# Patient Record
Sex: MALE | URBAN - METROPOLITAN AREA
[De-identification: names, ages, dates, MRNs, and addresses within clinical notes are randomized per-mention and may not be internally consistent; named-entity substitution may affect disease eponyms.]

---

## 2023-06-01 ENCOUNTER — APPOINTMENT (OUTPATIENT)
Dept: URBAN - METROPOLITAN AREA CLINIC 313 | Age: 58
Setting detail: DERMATOLOGY
End: 2023-06-01

## 2023-06-01 DIAGNOSIS — L27.0 GENERALIZED SKIN ERUPTION DUE TO DRUGS AND MEDICAMENTS TAKEN INTERNALLY: ICD-10-CM

## 2023-06-01 DIAGNOSIS — L91.8 OTHER HYPERTROPHIC DISORDERS OF THE SKIN: ICD-10-CM

## 2023-06-01 DIAGNOSIS — L90.5 SCAR CONDITIONS AND FIBROSIS OF SKIN: ICD-10-CM

## 2023-06-01 PROCEDURE — OTHER COUNSELING: OTHER

## 2023-06-01 PROCEDURE — OTHER PRESCRIPTION: OTHER

## 2023-06-01 PROCEDURE — 99213 OFFICE O/P EST LOW 20 MIN: CPT

## 2023-06-01 PROCEDURE — OTHER ADDITIONAL NOTES: OTHER

## 2023-06-01 RX ORDER — TRIAMCINOLONE ACETONIDE 1 MG/G
CREAM TOPICAL
Qty: 454 | Refills: 1 | Status: CANCELLED | COMMUNITY
Start: 2023-06-01

## 2023-06-01 RX ORDER — TRIAMCINOLONE ACETONIDE 1 MG/G
CREAM TOPICAL
Qty: 454 | Refills: 1 | Status: ERX

## 2023-06-01 ASSESSMENT — LOCATION DETAILED DESCRIPTION DERM
LOCATION DETAILED: RIGHT AXILLARY VAULT
LOCATION DETAILED: PERIUMBILICAL SKIN
LOCATION DETAILED: LEFT AXILLARY VAULT

## 2023-06-01 ASSESSMENT — LOCATION SIMPLE DESCRIPTION DERM
LOCATION SIMPLE: LEFT AXILLARY VAULT
LOCATION SIMPLE: RIGHT AXILLARY VAULT
LOCATION SIMPLE: ABDOMEN

## 2023-06-01 ASSESSMENT — LOCATION ZONE DERM
LOCATION ZONE: TRUNK
LOCATION ZONE: AXILLAE

## 2023-06-15 ENCOUNTER — APPOINTMENT (OUTPATIENT)
Dept: URBAN - METROPOLITAN AREA CLINIC 313 | Age: 58
Setting detail: DERMATOLOGY
End: 2023-06-15

## 2023-06-15 DIAGNOSIS — L20.89 OTHER ATOPIC DERMATITIS: ICD-10-CM

## 2023-06-15 DIAGNOSIS — L27.0 GENERALIZED SKIN ERUPTION DUE TO DRUGS AND MEDICAMENTS TAKEN INTERNALLY: ICD-10-CM

## 2023-06-15 PROBLEM — L20.84 INTRINSIC (ALLERGIC) ECZEMA: Status: ACTIVE | Noted: 2023-06-15

## 2023-06-15 PROCEDURE — OTHER PRESCRIPTION MEDICATION MANAGEMENT: OTHER

## 2023-06-15 PROCEDURE — OTHER COUNSELING: OTHER

## 2023-06-15 PROCEDURE — OTHER PRESCRIPTION: OTHER

## 2023-06-15 PROCEDURE — 99213 OFFICE O/P EST LOW 20 MIN: CPT

## 2023-06-15 RX ORDER — TRIAMCINOLONE ACETONIDE 1 MG/G
CREAM TOPICAL
Qty: 80 | Refills: 4 | Status: ERX

## 2023-06-15 ASSESSMENT — LOCATION SIMPLE DESCRIPTION DERM
LOCATION SIMPLE: RIGHT ELBOW
LOCATION SIMPLE: LEFT ELBOW

## 2023-06-15 ASSESSMENT — LOCATION DETAILED DESCRIPTION DERM
LOCATION DETAILED: RIGHT ELBOW
LOCATION DETAILED: LEFT ELBOW

## 2023-06-15 ASSESSMENT — LOCATION ZONE DERM: LOCATION ZONE: ARM

## 2023-06-15 NOTE — PROCEDURE: PRESCRIPTION MEDICATION MANAGEMENT
Render In Strict Bullet Format?: Yes
Initiate Treatment: Triamcinolone 0.1% ointment bid for 2 weeks to elbows
Detail Level: Zone

## 2023-06-15 NOTE — PROCEDURE: COUNSELING
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
Detail Level: Simple
Detail Level: Generalized

## 2023-07-17 ENCOUNTER — APPOINTMENT (OUTPATIENT)
Dept: URBAN - METROPOLITAN AREA CLINIC 313 | Age: 58
Setting detail: DERMATOLOGY
End: 2023-07-17

## 2023-07-17 DIAGNOSIS — L27.0 GENERALIZED SKIN ERUPTION DUE TO DRUGS AND MEDICAMENTS TAKEN INTERNALLY: ICD-10-CM

## 2023-07-17 DIAGNOSIS — L20.89 OTHER ATOPIC DERMATITIS: ICD-10-CM

## 2023-07-17 PROCEDURE — OTHER PRESCRIPTION MEDICATION MANAGEMENT: OTHER

## 2023-07-17 PROCEDURE — 99213 OFFICE O/P EST LOW 20 MIN: CPT

## 2023-07-17 PROCEDURE — OTHER COUNSELING: OTHER

## 2023-07-17 ASSESSMENT — LOCATION SIMPLE DESCRIPTION DERM
LOCATION SIMPLE: RIGHT ELBOW
LOCATION SIMPLE: LEFT ELBOW

## 2023-07-17 ASSESSMENT — LOCATION DETAILED DESCRIPTION DERM
LOCATION DETAILED: LEFT ELBOW
LOCATION DETAILED: RIGHT ELBOW

## 2023-07-17 ASSESSMENT — LOCATION ZONE DERM: LOCATION ZONE: ARM

## 2023-07-17 NOTE — PROCEDURE: PRESCRIPTION MEDICATION MANAGEMENT
Continue Regimen: triamcinolone acetonide 0.1 % topical cream \\nQuantity: 80.0 g  Days Supply: 30\\nSig: Apply twice daily to rash  on body up to 2 weeks hold for 1 week PRN with flares
Detail Level: Zone
Render In Strict Bullet Format?: Yes

## 2023-07-17 NOTE — PROCEDURE: COUNSELING
Detail Level: Simple
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
Detail Level: Generalized

## 2024-02-16 NOTE — PROCEDURE: ADDITIONAL NOTES
Elena SYLVIE Salgado  [address] [facility] [address]    Your procedure is on 3/4/24 Anticipated Arrival Time 10:00 AM  [place] [address] [place]  Procedure Name: Interlaminar Epidural Steroid Injection, Lumbar, L3/L4, Right Parasaggital     We understand that you are looking forward to pain relief, yet your safety and comfort is important to us during your procedure.  Your care team will assess you the day of your procedure.     If any of the below occur on the day of the procedure, you may be asked to reschedule:   low or high blood pressure   low or high blood sugar  irregular heartbeat   recent or current infection   not holding certain medications as instructed  not fasting (eating or drinking) for procedures that you are getting sedation for  any other medical reason which your care team feels it is best to delay your procedure    On the day of your procedure we are limited in our ability to manage your chronic pain medications. We are only able to provide a one time refill of existing medications that are not controlled substances and are at stable dosing. If you have questions regarding other medication options, or your medication is not working, we will facilitate a follow up appointment in office. Thank you for your understanding.    The time listed above is an estimated time. Our pre-procedural department will call you 1-2 days prior to your procedure to confirm your arrival time.  Please note- if your procedure is scheduled near the end of the day, you may be asked to move your procedure to an earlier time due to changes in the schedule and/or possibly reschedule to a different date.    If you are receiving sedation (something to help you relax) or having an epidural steroid injection, you must have a responsible adult  to take you home after your procedure. If you do not have a responsible adult , your procedure may be cancelled. If you choose to 
take the bus, cab, Uber, etc. and do not have a responsible adult to accompany you, your procedure may be cancelled.    Is a  needed?: Yes    Please notify the office immediately if:  You develop an infection of any kind  You have a fever within 7 days of your procedure  You start any new medications since you were seen in the office by the pain management provider, including antibiotics,   Received any dose of the COVID vaccine in the past two weeks or planning on getting a dosage within the next 2 weeks   No longer have any pain and wish to cancel the procedure    According to [facility] cancellation policy, if you need to change a scheduled appointment, you must call the Pain Management Department  at least 72 hours before your appointment to cancel or reschedule.     Elena is scheduled with local anesthetic. Patient is educated that it is not necessary to withhold from eating or drinking the day of their procedure.    One or more of the medications from your home medication list have antiplatelet or anticoagulant (blood thinning) properties.  Due to the risk for bleeding associated with your procedure the following medications must be stopped for the number of days indicated before your procedure:    Medication Name and Days to Hold:  Excedrin (6 day hold)  Ibuprofen (Advil, Motrin) (1 day hold)    Do you have any history of bleeding or clotting disorders? No    If you have a medical implant, please bring your remote control to turn off the device.     Please contact your insurance to verify benefits/coverage. If you have any insurance changes prior to your scheduled procedure, please contact our office.     Please note that an authorization/pre-approval obtained by our team is not a guarantee of payment. Also, pain diaries may be a requirement post-procedure, in order for the next procedure to be done. Any additional financial or billing questions, you can call our Patient Contact Center at 
[phone].    If you have any questions regarding these instructions, please call [facility] at [phone].         
Detail Level: Simple
Additional Notes: PT quoted 195$ for up to 15 to be removed\\n\\nWill remove once they are clear
Additional Notes: PT got gel INJ in knee 4/18 \\nOne week later formed severe rash and boils under axilla \\nER gave him doxycycline and then got a rash all over body
Render Risk Assessment In Note?: no